# Patient Record
Sex: MALE | Race: WHITE | Employment: STUDENT | URBAN - METROPOLITAN AREA
[De-identification: names, ages, dates, MRNs, and addresses within clinical notes are randomized per-mention and may not be internally consistent; named-entity substitution may affect disease eponyms.]

---

## 2021-07-08 ENCOUNTER — TRANSCRIBE ORDER (OUTPATIENT)
Dept: SCHEDULING | Age: 21
End: 2021-07-08

## 2021-07-08 DIAGNOSIS — S82.392A CLOSED INTRA-ARTICULAR FRACTURE OF DISTAL TIBIA, LEFT, INITIAL ENCOUNTER: Primary | ICD-10-CM

## 2021-07-08 DIAGNOSIS — S82.202A CLOSED FRACTURE OF LEFT TIBIA AND FIBULA, INITIAL ENCOUNTER: ICD-10-CM

## 2021-07-08 DIAGNOSIS — S82.402A CLOSED FRACTURE OF LEFT TIBIA AND FIBULA, INITIAL ENCOUNTER: ICD-10-CM

## 2021-07-11 ENCOUNTER — HOSPITAL ENCOUNTER (OUTPATIENT)
Dept: CT IMAGING | Age: 21
Discharge: HOME | End: 2021-07-11
Attending: ORTHOPAEDIC SURGERY
Payer: COMMERCIAL

## 2021-07-11 DIAGNOSIS — S82.402A CLOSED FRACTURE OF LEFT TIBIA AND FIBULA, INITIAL ENCOUNTER: ICD-10-CM

## 2021-07-11 DIAGNOSIS — S82.392A CLOSED INTRA-ARTICULAR FRACTURE OF DISTAL TIBIA, LEFT, INITIAL ENCOUNTER: ICD-10-CM

## 2021-07-11 DIAGNOSIS — S82.202A CLOSED FRACTURE OF LEFT TIBIA AND FIBULA, INITIAL ENCOUNTER: ICD-10-CM

## 2021-07-11 PROCEDURE — 73700 CT LOWER EXTREMITY W/O DYE: CPT

## 2022-01-12 ENCOUNTER — OFFICE VISIT (OUTPATIENT)
Dept: ORTHOPEDIC SURGERY | Age: 22
End: 2022-01-12
Payer: COMMERCIAL

## 2022-01-12 DIAGNOSIS — S82.832D FRACTURE OF DISTAL END OF LEFT TIBIA AND FIBULA WITH ROUTINE HEALING: Primary | ICD-10-CM

## 2022-01-12 DIAGNOSIS — M25.572 ACUTE LEFT ANKLE PAIN: ICD-10-CM

## 2022-01-12 DIAGNOSIS — S82.302D FRACTURE OF DISTAL END OF LEFT TIBIA AND FIBULA WITH ROUTINE HEALING: Primary | ICD-10-CM

## 2022-01-12 PROCEDURE — 99212 OFFICE O/P EST SF 10 MIN: CPT | Performed by: ORTHOPAEDIC SURGERY

## 2022-07-20 ENCOUNTER — OFFICE VISIT (OUTPATIENT)
Dept: ORTHOPEDIC SURGERY | Age: 22
End: 2022-07-20
Payer: COMMERCIAL

## 2022-07-20 VITALS — HEIGHT: 71 IN | WEIGHT: 210 LBS | BODY MASS INDEX: 29.4 KG/M2

## 2022-07-20 DIAGNOSIS — S82.832D FRACTURE OF DISTAL END OF TIBIA WITH FIBULA, LEFT, CLOSED, WITH ROUTINE HEALING, SUBSEQUENT ENCOUNTER: Primary | ICD-10-CM

## 2022-07-20 DIAGNOSIS — M25.672 STIFFNESS OF LEFT ANKLE JOINT: ICD-10-CM

## 2022-07-20 DIAGNOSIS — S82.302D FRACTURE OF DISTAL END OF TIBIA WITH FIBULA, LEFT, CLOSED, WITH ROUTINE HEALING, SUBSEQUENT ENCOUNTER: Primary | ICD-10-CM

## 2022-07-20 PROCEDURE — 99213 OFFICE O/P EST LOW 20 MIN: CPT | Performed by: ORTHOPAEDIC SURGERY

## 2022-07-20 NOTE — PROGRESS NOTES
Sonia Galaviz (: 2000) is a 19 y.o. male, patient,here for evaluation of the following   Chief Complaint   Patient presents with    Ankle Pain     Left ankle follow up pain        ASSESSMENT/PLAN:  Below is the assessment and plan developed based on review of pertinent history, physical exam, labs, studies, and medications. 1. Fracture of distal end of tibia with fibula, left, closed, with routine healing, subsequent encounter  -     XR STANDING ANKLE LT MIN 3 V; Future  -     REFERRAL TO PHYSICAL THERAPY  2. Stiffness of left ankle joint  -     REFERRAL TO PHYSICAL THERAPY    Patient is informed of findings on x-rays and exam.  Overall he is doing very well at a couple days over 1 year status post date of surgery I think he has progressed very well and has no early signs of arthritis at this point but his gait still has mild antalgic and he still has little bit of stiffness. This could be related to the injury itself as this was an intra-articular fracture. However he is not experiencing any pain seems to be doing okay. I have recommended continued therapy program to include other modalities such as dry needling. He will progress activities as tolerated. We briefly discussed removal of deep hardware if they become symptomatic such as painful and prominent under the skin but if he is doing okay with it no problems, I do not recommend removing the implants. If in the future he joined the [facility], he may have to have the implants removed as he did discuss the possibility of going in that direction. He will return if any problems arise and if he does return in the future we will get new x-rays of the left ankle 3 views weightbearing compared to contralateral ankle on AP view. Return if symptoms worsen or fail to improve. Not on File    No current outpatient medications on file. No current facility-administered medications for this visit. History reviewed.  No pertinent past medical history. History reviewed. No pertinent surgical history. Family History   Family history unknown: Yes       Social History     Socioeconomic History    Marital status: SINGLE     Spouse name: Not on file    Number of children: Not on file    Years of education: Not on file    Highest education level: Not on file   Occupational History    Not on file   Tobacco Use    Smoking status: Some Days     Types: Cigarettes    Smokeless tobacco: Never   Vaping Use    Vaping Use: Never used   Substance and Sexual Activity    Alcohol use: Not on file    Drug use: Never    Sexual activity: Not on file   Other Topics Concern    Not on file   Social History Narrative    Not on file     Social Determinants of Health     Financial Resource Strain: Not on file   Food Insecurity: Not on file   Transportation Needs: Not on file   Physical Activity: Not on file   Stress: Not on file   Social Connections: Not on file   Intimate Partner Violence: Not on file   Housing Stability: Not on file           Vitals:  Ht 5' 11\" (1.803 m)   Wt 210 lb (95.3 kg)   BMI 29.29 kg/m²    Body mass index is 29.29 kg/m². SUBJECTIVE/OBJECTIVE:  Lucia Grande (: 2000)   Patient back today for reevaluation of the left lower extremity distal tibia and fibula fracture that required surgical fixation. This was a severe injury to the articular area of the distal tibia and fibula fracture was a Lipscomb C type of injury. Patient is over 1 year out since date of injury and the exact date of injury was 2021, and his exact date of surgery was 2021. I had asked him to return at 1 year since date of injury to see how he is progressing and evaluate the articular surface of his ankle for any early signs of arthritis. States he is progressing well, he has focused on stretching program and physical therapy has been very helpful. He would like to continue therapy and other modalities during therapy sessions. Physical Exam  Pleasant well-nourished male , alert and oriented to person, time and place, no acute distress. Mild antalgic gait, satisfactory weightbearing stance. Left lower extremity/ankle: Calf soft nontender, the circumference of the calf is improved and almost similar to contralateral but not exactly the same. There is still slight smaller calf compared to contralateral, ankle range of motion satisfactory for dorsiflexion up to about neutral but plantarflexion still lacks about 5 to 10 degrees of complete plantarflexion, there is no joint effusion, no significant swelling, the incision is well-healed. Dorsiflexion/plantarflexion, inversion and eversion strength 5/5. The lateral malleolus is nontender, the implants are not easily palpable, the distal tibia implant also not easily palpable in the area is nontender. There is no malalignment or deformity to the ankle. Achilles tendon intact with a negative Strickland test.    Left foot: Nontender, no swelling, ligament stable. Able to flex and extend all toes with good range of motion strength 5/5. Contralateral foot and ankle exam, nontender, no swelling ligaments grossly stable. Normal weightbearing stance. Neurovascular exam intact for light touch sensation, cap refill, dorsalis pedis pulse palpable, flexion/extension strength 5/5. Skin intact without open wounds, lesions or ulcers, no skin discolorations, normal warmth to skin. Reviewed surgical incision anterior ankle is fully healed. Imaging:    XR Results (most recent):  Results from Appointment encounter on 07/20/22    XR STANDING ANKLE LT MIN 3 V    Narrative  Left ankle standing AP, lateral and oblique x-rays show implants intact, all the fractures of the distal fibula and lateral malleolus completely healed, no loosening of implants, normal ankle joint mortise and space. An electronic signature was used to authenticate this note.   -- Giovanny Guerrero MD
